# Patient Record
Sex: FEMALE | Race: WHITE | NOT HISPANIC OR LATINO | URBAN - NONMETROPOLITAN AREA
[De-identification: names, ages, dates, MRNs, and addresses within clinical notes are randomized per-mention and may not be internally consistent; named-entity substitution may affect disease eponyms.]

---

## 2021-09-13 ENCOUNTER — IMPORTED ENCOUNTER (OUTPATIENT)
Dept: URBAN - NONMETROPOLITAN AREA CLINIC 1 | Facility: CLINIC | Age: 56
End: 2021-09-13

## 2021-09-13 PROBLEM — H00.14: Noted: 2021-09-13

## 2021-09-13 PROCEDURE — 67800 REMOVE EYELID LESION: CPT

## 2021-09-13 NOTE — PATIENT DISCUSSION
Large Chalazion MAGDALENA w white head- inflamed MAGDALENA-suggested to drain for now until pt gets back home to [place]-Cont erythromycin taco BID OS-Pt wishes to proceed with drainage of chalazion MAGDALENA- Will have it rechecked when she gets back to [place] -Drainage of Chalazion was successful PRN

## 2022-04-09 ASSESSMENT — VISUAL ACUITY
OD_CC: 20/25
OS_CC: 20/70

## 2022-04-09 ASSESSMENT — TONOMETRY: OD_IOP_MMHG: 14

## 2024-06-25 NOTE — PATIENT DISCUSSION
Multiple outreach attempts. No answer. Remove from pcp panel   ANTIOXIDANTS WITHOUT ZINC based on Macular Risk test and recent data is recommended.